# Patient Record
Sex: FEMALE | Race: OTHER | NOT HISPANIC OR LATINO | URBAN - METROPOLITAN AREA
[De-identification: names, ages, dates, MRNs, and addresses within clinical notes are randomized per-mention and may not be internally consistent; named-entity substitution may affect disease eponyms.]

---

## 2023-10-03 ENCOUNTER — EMERGENCY (EMERGENCY)
Facility: HOSPITAL | Age: 38
LOS: 1 days | Discharge: HOME | End: 2023-10-03
Attending: EMERGENCY MEDICINE | Admitting: EMERGENCY MEDICINE
Payer: COMMERCIAL

## 2023-10-03 VITALS
HEART RATE: 87 BPM | OXYGEN SATURATION: 99 % | RESPIRATION RATE: 15 BRPM | TEMPERATURE: 98 F | SYSTOLIC BLOOD PRESSURE: 127 MMHG | DIASTOLIC BLOOD PRESSURE: 81 MMHG

## 2023-10-03 VITALS
HEIGHT: 63 IN | RESPIRATION RATE: 16 BRPM | DIASTOLIC BLOOD PRESSURE: 96 MMHG | WEIGHT: 156.09 LBS | HEART RATE: 91 BPM | TEMPERATURE: 99 F | OXYGEN SATURATION: 98 % | SYSTOLIC BLOOD PRESSURE: 134 MMHG

## 2023-10-03 PROCEDURE — 71046 X-RAY EXAM CHEST 2 VIEWS: CPT | Mod: 26

## 2023-10-03 PROCEDURE — 99284 EMERGENCY DEPT VISIT MOD MDM: CPT

## 2023-10-03 NOTE — ED ADULT NURSE NOTE - OBJECTIVE STATEMENT
39 y/o female here for eval of left arm pain and back pain; endorses she is flying soon and has reservations flying if there could be something going on. patient pending xray imaging of chest. provider aware. denies chest pain/ shortness of breath/ nausea vomiting/ diarrhea /headache

## 2023-10-03 NOTE — ED ADULT TRIAGE NOTE - CHIEF COMPLAINT QUOTE
Pt. walk in c/o L. arm and L. upper back pain since last night and is concerned about having a life threatening condition before she travels this weekend. Denies CP, SOB, n/v, dizziness. No PMH.

## 2023-10-03 NOTE — ED PROVIDER NOTE - PROGRESS NOTE DETAILS
Moo PEARCE: Pt is stable and ready for discharge. Strict return precautions given. All questions answered. CXR performed (wet read) shows no effusions consolidation, clavicle and shoulder in place.

## 2023-10-03 NOTE — ED PROVIDER NOTE - PATIENT PORTAL LINK FT
You can access the FollowMyHealth Patient Portal offered by [facility] by registering at the following website: [email or web address]. By joining [employer]’s FollowMyHealth portal, you will also be able to view your health information using other applications (apps) compatible with our system.

## 2023-10-03 NOTE — ED PROVIDER NOTE - NSFOLLOWUPINSTRUCTIONS_ED_ALL_ED_FT
Musculoskeletal Pain    Your pain is likely due to musculoskeletal reasons. Take ibuprofen and Tylenol as labeled on the pill bottles. Use ice to alleviate the pain (20 minutes on, 20 minutes off). Do not put ice direct on the skin.    Return to the ED if you experience redness, warmth, increased swelling, chest pain, shortness of breath, or any other alarming symptoms.     The results of any blood tests and imaging studies completed during your visit today were discussed and explained to you and a copy provided with your discharge instructions. Please follow up with your primary care doctor within 48 hours.

## 2023-10-03 NOTE — ED PROVIDER NOTE - ATTENDING CONTRIBUTION TO CARE
Patient presents with left shoulder pain that is worse with palpation and with some movement.  Patient feels this could have started when she was doing some heavy lifting or working out.  Patient was concerned that this could be a sign for cardiovascular disease denies any chest pain or shortness of breath at this time.  Has normal vital signs no tachycardia no hypoxia.  EKG is a normal sinus rhythm heart rate 75 with no ST-T changes, no ischemia.  Will get chest x-ray to rule out structural cardiopulmonary pathology.  Likely discharge after x-ray.  Reassurance provided.

## 2023-10-03 NOTE — ED PROVIDER NOTE - OBJECTIVE STATEMENT
38-year-old female with past medical history of PCOS, migraines, and anxiety complaining of 1 day history of localized left shoulder pain with radiation to the elbow that is intermittent, moderate, and pressure-like.  Patient is otherwise asymptomatic.  No recent surgeries, travel, immobilization, no OCP use. Denies fevers, chills, nausea, vomiting, dizziness, chest pain, SOB, abdominal pain, dysuria, hematuria.

## 2023-10-03 NOTE — ED PROVIDER NOTE - CLINICAL SUMMARY MEDICAL DECISION MAKING FREE TEXT BOX
38-year-old female with past medical history of PCOS, migraines, and anxiety complaining of 1 day history of localized left shoulder pain with radiation to the elbow that is intermittent, moderate, and pressure-like.  Patient is otherwise asymptomatic.  No recent surgeries, travel, immobilization, no OCP use. Denies fevers, chills, nausea, vomiting, dizziness, chest pain, SOB, abdominal pain, dysuria, hematuria. Benign physical examination. Low concerns for fractures or dislocations at this time.  Patient has very low risk for pulmonary embolism as patient is hemodynamically stable, saturating well on room air, no shortness of breath, not tachycardic, no recent travels, surgeries, immobilizations, and no OCP use.  Patient is otherwise young without cardiac comorbidities, low concern for ACS at this time.  EKG is unremarkable and within normal limits.  Will obtain chest x-ray and reassess with likely discharge with close PCP follow-up.

## 2023-10-05 DIAGNOSIS — F32.A DEPRESSION, UNSPECIFIED: ICD-10-CM

## 2023-10-05 DIAGNOSIS — M25.512 PAIN IN LEFT SHOULDER: ICD-10-CM

## 2023-10-05 DIAGNOSIS — G43.909 MIGRAINE, UNSPECIFIED, NOT INTRACTABLE, WITHOUT STATUS MIGRAINOSUS: ICD-10-CM

## 2023-10-05 DIAGNOSIS — Z87.42 PERSONAL HISTORY OF OTHER DISEASES OF THE FEMALE GENITAL TRACT: ICD-10-CM

## 2024-01-03 NOTE — ED PROVIDER NOTE - BIRTH SEX
R/S from 1/4 [place]  COLONOSCOPY on  3/6/2024 arrive at 1:00  . Sent prep instructions to pt my chart....miralax     Female

## 2024-01-20 NOTE — ED ADULT NURSE NOTE - DOES PATIENT HAVE ADVANCE DIRECTIVE
No
You can access the FollowMyHealth Patient Portal offered by [facility] by registering at the following website: [email or web address]. By joining [employer]’s FollowMyHealth portal, you will also be able to view your health information using other applications (apps) compatible with our system.

## 2024-11-24 NOTE — ED ADULT NURSE NOTE - EXTENSIONS OF SELF_ADULT
None
Bed/Stretcher in lowest position, wheels locked, appropriate side rails in place/Call bell, personal items and telephone in reach/Instruct patient to call for assistance before getting out of bed/chair/stretcher/Non-slip footwear applied when patient is off stretcher/[place] to call system/Physically safe environment - no spills, clutter or unnecessary equipment/Purposeful proactive rounding/Room/bathroom lighting operational, light cord in reach